# Patient Record
Sex: FEMALE | URBAN - METROPOLITAN AREA
[De-identification: names, ages, dates, MRNs, and addresses within clinical notes are randomized per-mention and may not be internally consistent; named-entity substitution may affect disease eponyms.]

---

## 2023-08-18 ENCOUNTER — LAB OUTSIDE AN ENCOUNTER (OUTPATIENT)
Dept: URBAN - METROPOLITAN AREA CLINIC 40 | Facility: CLINIC | Age: 58
End: 2023-08-18

## 2023-08-18 ENCOUNTER — DASHBOARD ENCOUNTERS (OUTPATIENT)
Age: 58
End: 2023-08-18

## 2023-08-18 ENCOUNTER — OFFICE VISIT (OUTPATIENT)
Dept: URBAN - METROPOLITAN AREA CLINIC 40 | Facility: CLINIC | Age: 58
End: 2023-08-18
Payer: COMMERCIAL

## 2023-08-18 ENCOUNTER — WEB ENCOUNTER (OUTPATIENT)
Dept: URBAN - METROPOLITAN AREA CLINIC 40 | Facility: CLINIC | Age: 58
End: 2023-08-18

## 2023-08-18 VITALS
HEART RATE: 51 BPM | WEIGHT: 162.2 LBS | SYSTOLIC BLOOD PRESSURE: 116 MMHG | HEIGHT: 59 IN | DIASTOLIC BLOOD PRESSURE: 72 MMHG | TEMPERATURE: 97.9 F | BODY MASS INDEX: 32.7 KG/M2

## 2023-08-18 DIAGNOSIS — K59.04 CHRONIC IDIOPATHIC CONSTIPATION: ICD-10-CM

## 2023-08-18 DIAGNOSIS — Z12.11 COLON CANCER SCREENING: ICD-10-CM

## 2023-08-18 DIAGNOSIS — R04.2 HEMOPTYSIS: ICD-10-CM

## 2023-08-18 DIAGNOSIS — R10.9 ABDOMINAL CRAMPS: ICD-10-CM

## 2023-08-18 DIAGNOSIS — R11.2 NAUSEA AND VOMITING, UNSPECIFIED VOMITING TYPE: ICD-10-CM

## 2023-08-18 DIAGNOSIS — R13.10 DYSPHAGIA, UNSPECIFIED TYPE: ICD-10-CM

## 2023-08-18 PROBLEM — 40739000: Status: ACTIVE | Noted: 2023-08-18

## 2023-08-18 PROBLEM — 82934008: Status: ACTIVE | Noted: 2023-08-18

## 2023-08-18 PROCEDURE — 99203 OFFICE O/P NEW LOW 30 MIN: CPT | Performed by: NURSE PRACTITIONER

## 2023-08-18 RX ORDER — LIDOCAINE 140 MG/1
1 PATCH REMOVE AFTER 12 HOURS PATCH CUTANEOUS ONCE A DAY
Status: ACTIVE | COMMUNITY

## 2023-08-18 RX ORDER — METFORMIN HYDROCHLORIDE 500 MG/1
1 TABLET WITH A MEAL TABLET, FILM COATED ORAL ONCE A DAY
Status: ACTIVE | COMMUNITY

## 2023-08-18 RX ORDER — HYDRALAZINE HYDROCHLORIDE 25 MG/1
1 TABLET WITH FOOD TABLET, FILM COATED ORAL THREE TIMES A DAY
Status: ACTIVE | COMMUNITY

## 2023-08-18 RX ORDER — AMLODIPINE BESYLATE 10 MG/1
1 TABLET TABLET ORAL ONCE A DAY
Status: ACTIVE | COMMUNITY

## 2023-08-18 RX ORDER — ONDANSETRON 4 MG/1
1 TABLET ON THE TONGUE AND ALLOW TO DISSOLVE TABLET, ORALLY DISINTEGRATING ORAL ONCE A DAY
Status: ACTIVE | COMMUNITY

## 2023-08-18 RX ORDER — LATANOPROST 50 UG/ML
1 DROP INTO AFFECTED EYE IN THE EVENING SOLUTION/ DROPS OPHTHALMIC ONCE A DAY
Status: ACTIVE | COMMUNITY

## 2023-08-18 RX ORDER — HYDROCHLOROTHIAZIDE 25 MG/1
1 TABLET IN THE MORNING TABLET ORAL ONCE A DAY
Status: ACTIVE | COMMUNITY

## 2023-08-18 RX ORDER — DORZOLAMIDE HCL 20 MG/ML
1 DROP INTO AFFECTED EYE SOLUTION/ DROPS OPHTHALMIC THREE TIMES A DAY
Status: ACTIVE | COMMUNITY

## 2023-08-18 RX ORDER — POLYETHYLENE GLYCOL 3350, SODIUM SULFATE, SODIUM CHLORIDE, POTASSIUM CHLORIDE, ASCORBIC ACID, SODIUM ASCORBATE 140-9-5.2G
AS DIRECTED KIT ORAL
Qty: 1 KIT | Refills: 0 | OUTPATIENT
Start: 2023-08-19 | End: 2023-08-20

## 2023-08-18 RX ORDER — DULOXETINE 30 MG/1
1 CAPSULE CAPSULE, DELAYED RELEASE PELLETS ORAL ONCE A DAY
Status: ACTIVE | COMMUNITY

## 2023-08-18 RX ORDER — DICYCLOMINE HYDROCHLORIDE 20 MG/1
1 TABLET TABLET ORAL THREE TIMES A DAY
Status: ACTIVE | COMMUNITY

## 2023-08-18 RX ORDER — ATORVASTATIN CALCIUM 40 MG/1
1 TABLET TABLET, FILM COATED ORAL ONCE A DAY
Status: ACTIVE | COMMUNITY

## 2023-08-18 NOTE — PHYSICAL EXAM GASTROINTESTINAL
Abdomen , soft, generalized mild tenderness, nondistended , no guarding or rigidity , no masses palpable , normal bowel sounds , Liver and Spleen,  no hepatosplenomegaly , liver nontender

## 2023-08-18 NOTE — HPI-TODAY'S VISIT:
58-year old female patient with past medical history as listed below. A few rescheduled and no-show appointments with Dr. Romero back in 2017,  new to this practice.  Referral from [facility] PCP dated July 31, 2023, no other accompanying records or documents with the referral aside from general gastroenterology referral for dysphagia and chronic constipation.  -- CT abdomen and pelvis dated 6/23/2023: diffuse hypoattenuation of the liver compatible with hepatic steatosis.  The liver is otherwise unremarkable in size and contour, no intrahepatic or extrahepatic ductal dilation.  Gallbladder shows multiple surgical clips in the gallbladder fossa suggesting cholecystectomy.  Pancreas enhances homogenously there is no pancreatic fluid collection or inflammatory change no ductal distention.  Spleen is normal, adrenals unremarkable, kidneys unremarkable, gastrointestinal, there is circumferential wall thickening of the distal esophagus.  stomach small bowel and colon appear normal, mild distention of the cecum,  appendix appears normal.  leiomyomatous uterus noted. -- Labs dated 6/23/2023: potassium was low at 2.7 no anemia noted, otherwise labs unremarkable. Repeat K+ 07/17/2023 3.0, AST 14, ALT 14, Platelets 261, hepatitis C neg,  patient placed on PO K+.   -- The patient presents today as for referral as above. She has complaints of dysphagia, getting choked on her food, she also states that she spits up at night what feels like gel that is not coming from her sinuses and sometimes it's a little bit bloody, worse the past 2 months.  She states her mom had ulcers and had a hole in her stomach and had to have an open "chest" surgery to correct that.  She states her mom also had colon polyps.  She has complaints today of generalized abdominal pain, being constipated with hard, pebbly stools.  She states that one day she was so constipated and felt so full that she vomited.  She takes MiraLAX daily sometimes twice daily as well as suppositories and Dulcolax, she states she drinks a lot of water.  She states the constipation has been going on for years.  She says that she had a colonoscopy in [place] sometime in the 90s she thinks may be 1998 at [facility].  The patient is not a very good historian.  Denies hematochezia, dark stools, mucus in the stools. Denies alcohol, tobacco, NSAIDs.

## 2023-10-11 ENCOUNTER — OFFICE VISIT (OUTPATIENT)
Dept: URBAN - METROPOLITAN AREA SURGERY CENTER 30 | Facility: SURGERY CENTER | Age: 58
End: 2023-10-11

## 2023-11-03 ENCOUNTER — OFFICE VISIT (OUTPATIENT)
Dept: URBAN - METROPOLITAN AREA CLINIC 40 | Facility: CLINIC | Age: 58
End: 2023-11-03